# Patient Record
Sex: FEMALE | Race: ASIAN | URBAN - METROPOLITAN AREA
[De-identification: names, ages, dates, MRNs, and addresses within clinical notes are randomized per-mention and may not be internally consistent; named-entity substitution may affect disease eponyms.]

---
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
## 2023-12-05 ENCOUNTER — MEDICAL CORRESPONDENCE (OUTPATIENT)
Dept: HEALTH INFORMATION MANAGEMENT | Facility: CLINIC | Age: 23
End: 2023-12-05
Payer: COMMERCIAL

## 2023-12-11 ENCOUNTER — TRANSCRIBE ORDERS (OUTPATIENT)
Dept: OTHER | Age: 23
End: 2023-12-11

## 2023-12-11 DIAGNOSIS — N93.9 ABNORMAL UTERINE BLEEDING: Primary | ICD-10-CM

## 2023-12-12 ENCOUNTER — LAB (OUTPATIENT)
Dept: LAB | Facility: CLINIC | Age: 23
End: 2023-12-12
Payer: COMMERCIAL

## 2023-12-12 ENCOUNTER — OFFICE VISIT (OUTPATIENT)
Dept: OBGYN | Facility: CLINIC | Age: 23
End: 2023-12-12
Payer: COMMERCIAL

## 2023-12-12 VITALS
WEIGHT: 132.2 LBS | SYSTOLIC BLOOD PRESSURE: 124 MMHG | HEART RATE: 97 BPM | DIASTOLIC BLOOD PRESSURE: 76 MMHG | BODY MASS INDEX: 24.96 KG/M2 | HEIGHT: 61 IN

## 2023-12-12 DIAGNOSIS — N93.9 ABNORMAL UTERINE BLEEDING: ICD-10-CM

## 2023-12-12 LAB
ERYTHROCYTE [DISTWIDTH] IN BLOOD BY AUTOMATED COUNT: 18.6 % (ref 10–15)
HCT VFR BLD AUTO: 30.4 % (ref 35–47)
HGB BLD-MCNC: 8.7 G/DL (ref 11.7–15.7)
HOLD SPECIMEN: NORMAL
MCH RBC QN AUTO: 19 PG (ref 26.5–33)
MCHC RBC AUTO-ENTMCNC: 28.6 G/DL (ref 31.5–36.5)
MCV RBC AUTO: 67 FL (ref 78–100)
PLATELET # BLD AUTO: 560 10E3/UL (ref 150–450)
RBC # BLD AUTO: 4.57 10E6/UL (ref 3.8–5.2)
WBC # BLD AUTO: 5.9 10E3/UL (ref 4–11)

## 2023-12-12 PROCEDURE — G0463 HOSPITAL OUTPT CLINIC VISIT: HCPCS

## 2023-12-12 PROCEDURE — 99203 OFFICE O/P NEW LOW 30 MIN: CPT | Mod: GE | Performed by: OBSTETRICS & GYNECOLOGY

## 2023-12-12 PROCEDURE — 85247 CLOT FACTOR VIII MULTIMETRIC: CPT

## 2023-12-12 PROCEDURE — 85240 CLOT FACTOR VIII AHG 1 STAGE: CPT

## 2023-12-12 PROCEDURE — 36415 COLL VENOUS BLD VENIPUNCTURE: CPT

## 2023-12-12 PROCEDURE — 85246 CLOT FACTOR VIII VW ANTIGEN: CPT

## 2023-12-12 PROCEDURE — 85245 CLOT FACTOR VIII VW RISTOCTN: CPT

## 2023-12-12 PROCEDURE — 85245 CLOT FACTOR VIII VW RISTOCTN: CPT | Mod: 59

## 2023-12-12 PROCEDURE — 85390 FIBRINOLYSINS SCREEN I&R: CPT | Mod: 26 | Performed by: PATHOLOGY

## 2023-12-12 PROCEDURE — 85027 COMPLETE CBC AUTOMATED: CPT

## 2023-12-12 RX ORDER — FLUCONAZOLE 150 MG/1
TABLET ORAL
COMMUNITY
Start: 2023-12-06

## 2023-12-12 RX ORDER — METRONIDAZOLE 500 MG/1
TABLET ORAL
COMMUNITY
Start: 2023-12-05

## 2023-12-12 NOTE — LETTER
2023       RE: Nitza Altamirano  [address] [address]     Dear Colleague,    Thank you for referring your patient, Nitza Altamirano, to the [facility] at [facility]. Please see a copy of my visit note below.    [facility]  New Gynecology Visit    Reason for Referral: Abnormal uterine bleeding  Referring Provider: Truong Hugo    SUBJECTIVE     HPI:    Nitza Altamirano is a 23 year old  who presents today to discuss recent approximately 1 month episode of abnormal bleeding.  Patient reports a history of heavy bleeding with regular menses since menarche.  This past April she began taking birth control pills (Chateal).  She did feel like this helped with the bleeding and cramping.  Did help with this bleeding and cramping.  She continued to have regular menses until 1 month ago (early November) when she began having bleeding episodes similar to a menses every 2 to 3 days.  She states the bleeding would come on suddenly, she would bleed for a couple days, then it would stop without intervention.  During  heavier bleeding episodes would soak a maxi pad/super tampon every 2-3 hours. Very light sometimes no bleeding between these episodes.     [facility] and yeast infection.  Reports that her bleeding has been somewhat improved most notably after finishing course of antibiotics.     In addition to this vaginal bleeding she reports persistent abdominal pain.  Also describes a history of pain with intercourse.    Today patient states that she is no longer having any bleeding.  She does describe a history of heavy/prolonged bleeding and herself, her sisters, and her mother.  She describes having heavy nosebleeds throughout high school and early college years.  She never required a blood  transfusion.  Additionally notes a history of easy bruising.  Further describing this by stating she notices unexplained bruising every 1 to 2 months.  Denies ever having a workup for a bleeding disorder.  Denies family history of known bleeding disorder.    Of note stopped taking anxiety/depression meds (setraline)  in October. Did not feel like it was helping.     GYN History  - LMP: No LMP recorded. (Menstrual status: Birth Control).  Menstrual History:      2023     3:50 PM   Menstrual History   LAST MENSTRUAL PERIOD    Menarche Age 12 years   Period Duration (Days) 3-4   Method of Contraception Combined oral contraceptive   Period Pattern Regular   Menstrual Flow Moderate   Dysmenorrhea Moderate   PMS Symptoms Cramping;Other (Comment)   Comments mild bloating, more depression/anxiety during menses   - Pap Smears: Never  - Contraception: birth control pills (Chateal)  - Sexual Activity/Concerns: Pain with intercourse.  No other concerns.  - Hx STIs/UTIs: Denies.  [facility], negative.    Obstetric History  OB History    Para Term  AB Living   0 0 0 0 0 0   SAB IAB Ectopic Multiple Live Births   0 0 0 0 0       Past Medical History  Past Medical History:   Diagnosis Date    Anxiety     Depression        Past Surgical History  No past surgical history on file.    Medications  Current Outpatient Medications   Medication    fluconazole (DIFLUCAN) 150 MG tablet    metroNIDAZOLE (FLAGYL) 500 MG tablet     No current facility-administered medications for this visit.       Allergies  Allergies   Allergen Reactions    Nka [No Known Allergies]        Social History  Social History     Tobacco Use    Smoking status: Never    Smokeless tobacco: Never   Substance Use Topics    Drug use: Never       Family History  Family History   Problem Relation Age of Onset    Breast Cancer No family hx of     Ovarian Cancer No family hx of      ROS: 10-Point ROS negative except as noted in  "HPI    OBJECTIVE     Physical Exam  /76   Pulse 97   Ht 1.549 m (5' 1\")   Wt 60 kg (132 lb 3.2 oz)   BMI 24.98 kg/m    Gen: Well-appearing, NAD  HEENT: Normocephalic, atraumatic  CV:  Regular rate, appears well-perfused  Pulm: Nonlabored breathing on room air  Abd: Soft, non-tender, non-distended      Review of patient provided labs from [facility].  Patient working on having documentation sent over.  TSH 2.46  Hgb 8.9  Plt 578  HIV negative  Syphillis negative   Gonorrhea negative  Chlamydia negative  Wet Prep: +whiff test, + clue cells, + yeast, - trichomonas       ASSESSMENT & PLAN     Then HORTENSIA Altamirano is a 23 year old  female here to discuss a 1 month history of intermittent heavy vaginal bleeding.  Prior to this had q. monthly cycles.  Currently on OCPs.  [facility] seen [facility], diagnosed with BV.  Labs notable for anemia with hemoglobin at 8.9.  TSH normal.  Records not available for review today, reviewed with patient electronic copy on her cell phone.  Transcribed as above.  Patient will work on having records sent to our clinic.  Discussed possible etiologies for this bleeding.  Upon obtaining history patient noted to have a long history of heavy nosebleeds, easy bruising, and heavy periods.  Given this discussed with patient initial workup for possible bleeding disorder to which she is amenable.  Also discussed ultrasound to evaluate for any structural causes of possible bleeding.  Blood work today will include CBC to evaluate hemoglobin.  Encourage patient to begin taking iron supplements.    Plan:     # Abnormal uterine bleeding  Plan: US Pelvic Complete with Transvaginal, US         Abdomen Complete, Factor 8 assay, Von         Willebrand antigen, VWF Activity with reflex to        Ristocetin Cofactor Activity, Von Willebrand         Multimers, von Willebrand Interpretation, CBC         with Platelets         Working on setting up [employer] access.  Clinical gynecology " team to call patient with lab/imaging results.      Staffed with Dr. Roseanna Quezada DO, MS  OBGYN Resident, PGY3  [facility]  12/14/2023 5:23 PM    The Patient was seen in [facility] by    YVETTE QUEZADA, .  I reviewed the history & exam. Assessment and plan were jointly made.    Yakelin Condon MD

## 2023-12-12 NOTE — PROGRESS NOTES
[facility] Gynecology Visit    Reason for Referral: Abnormal uterine bleeding  Referring Provider: Truong Hugo    SUBJECTIVE     HPI:    Then HORTENSIA Altamriano is a 23 year old  who presents today to discuss recent approximately 1 month episode of abnormal bleeding.  Patient reports a history of heavy bleeding with regular menses since menarche.  This past April she began taking birth control pills (Chateal).  She did feel like this helped with the bleeding and cramping.  Did help with this bleeding and cramping.  She continued to have regular menses until 1 month ago (early November) when she began having bleeding episodes similar to a menses every 2 to 3 days.  She states the bleeding would come on suddenly, she would bleed for a couple days, then it would stop without intervention.  During  heavier bleeding episodes would soak a maxi pad/super tampon every 2-3 hours. Very light sometimes no bleeding between these episodes.     [facility] and yeast infection.  Reports that her bleeding has been somewhat improved most notably after finishing course of antibiotics.     In addition to this vaginal bleeding she reports persistent abdominal pain.  Also describes a history of pain with intercourse.    Today patient states that she is no longer having any bleeding.  She does describe a history of heavy/prolonged bleeding and herself, her sisters, and her mother.  She describes having heavy nosebleeds throughout high school and early college years.  She never required a blood transfusion.  Additionally notes a history of easy bruising.  Further describing this by stating she notices unexplained bruising every 1 to 2 months.  Denies ever having a workup for a bleeding disorder.  Denies family history of known bleeding disorder.    Of note stopped taking anxiety/depression meds  "(setraline)  in October. Did not feel like it was helping.     GYN History  - LMP: No LMP recorded. (Menstrual status: Birth Control).  Menstrual History:      2023     3:50 PM   Menstrual History   LAST MENSTRUAL PERIOD    Menarche Age 12 years   Period Duration (Days) 3-4   Method of Contraception Combined oral contraceptive   Period Pattern Regular   Menstrual Flow Moderate   Dysmenorrhea Moderate   PMS Symptoms Cramping;Other (Comment)   Comments mild bloating, more depression/anxiety during menses   - Pap Smears: Never  - Contraception: birth control pills (Chateal)  - Sexual Activity/Concerns: Pain with intercourse.  No other concerns.  - Hx STIs/UTIs: Denies.  [facility], negative.    Obstetric History  OB History    Para Term  AB Living   0 0 0 0 0 0   SAB IAB Ectopic Multiple Live Births   0 0 0 0 0       Past Medical History  Past Medical History:   Diagnosis Date    Anxiety     Depression        Past Surgical History  No past surgical history on file.    Medications  Current Outpatient Medications   Medication    fluconazole (DIFLUCAN) 150 MG tablet    metroNIDAZOLE (FLAGYL) 500 MG tablet     No current facility-administered medications for this visit.       Allergies  Allergies   Allergen Reactions    Nka [No Known Allergies]        Social History  Social History     Tobacco Use    Smoking status: Never    Smokeless tobacco: Never   Substance Use Topics    Drug use: Never       Family History  Family History   Problem Relation Age of Onset    Breast Cancer No family hx of     Ovarian Cancer No family hx of      ROS: 10-Point ROS negative except as noted in HPI    OBJECTIVE     Physical Exam  /76   Pulse 97   Ht 1.549 m (5' 1\")   Wt 60 kg (132 lb 3.2 oz)   BMI 24.98 kg/m    Gen: Well-appearing, NAD  HEENT: Normocephalic, atraumatic  CV:  Regular rate, appears well-perfused  Pulm: Nonlabored breathing on room air  Abd: Soft, non-tender, " non-distended      Review of patient provided labs from [facility].  Patient working on having documentation sent over.  TSH 2.46  Hgb 8.9  Plt 578  HIV negative  Syphillis negative   Gonorrhea negative  Chlamydia negative  Wet Prep: +whiff test, + clue cells, + yeast, - trichomonas       ASSESSMENT & PLAN     Then HORTENSIA Altamirano is a 23 year old  female here to discuss a 1 month history of intermittent heavy vaginal bleeding.  Prior to this had q. monthly cycles.  Currently on OCPs.  [facility] seen at [facility], diagnosed with BV.  Labs notable for anemia with hemoglobin at 8.9.  TSH normal.  Records not available for review today, reviewed with patient electronic copy on her cell phone.  Transcribed as above.  Patient will work on having records sent to our clinic.  Discussed possible etiologies for this bleeding.  Upon obtaining history patient noted to have a long history of heavy nosebleeds, easy bruising, and heavy periods.  Given this discussed with patient initial workup for possible bleeding disorder to which she is amenable.  Also discussed ultrasound to evaluate for any structural causes of possible bleeding.  Blood work today will include CBC to evaluate hemoglobin.  Encourage patient to begin taking iron supplements.    Plan:     # Abnormal uterine bleeding  Plan: US Pelvic Complete with Transvaginal, US         Abdomen Complete, Factor 8 assay, Von         Willebrand antigen, VWF Activity with reflex to        Ristocetin Cofactor Activity, Von Willebrand         Multimers, von Willebrand Interpretation, CBC         with Platelets         Working on setting up [employer] access.  Clinical gynecology team to call patient with lab/imaging results.      Staffed with Dr. Roseanna Hardin DO, MS  OBGYN Resident, PGY3  [facility]  2023 5:23 PM    The Patient was seen in [facility] Clinic by    YVETTE HARDIN  VIDEO, .  I  reviewed the history & exam. Assessment and plan were jointly made.    Yakelin Condon MD

## 2023-12-12 NOTE — PATIENT INSTRUCTIONS
Thank you for trusting us with your care!     If you need to contact us for questions about:  Symptoms, Scheduling & Medical Questions; Non-urgent (2-3 day response) Elyse message, Urgent (needing response today) [phone] (if after 3:30pm next day response)   Prescriptions: Please call your Pharmacy   Billing: Jesus [phone] or [employer]:[phone]

## 2023-12-15 LAB
FACT VIII ACT/NOR PPP: 216 % (ref 55–200)
VWF AG ACT/NOR PPP IA: 128 % (ref 50–200)
VWF MULTIMERS PPP IB: NORMAL
VWF:AC ACT/NOR PPP IA: 91 % (ref 50–180)

## 2023-12-17 LAB — VWF:RCO ACT/NOR PPP PL AGG: 76 %

## 2023-12-21 LAB — VWF MULTIMERS PPP QL: NORMAL

## 2023-12-22 LAB
LOCATION OF TASK: NORMAL
VON WILLEBRAND EVAL PPP-IMP: NORMAL

## 2023-12-27 ENCOUNTER — TELEPHONE (OUTPATIENT)
Dept: OBGYN | Facility: CLINIC | Age: 23
End: 2023-12-27
Payer: COMMERCIAL

## 2023-12-27 NOTE — TELEPHONE ENCOUNTER
----- Message from Kody Sneed MD sent at 12/27/2023  1:49 PM CST -----  Can you call this patient to re-schedule her ultrasound? She also needs a follow up appt in clinic after her US.     Thanks,  Kody